# Patient Record
Sex: MALE | Race: BLACK OR AFRICAN AMERICAN | Employment: FULL TIME | URBAN - METROPOLITAN AREA
[De-identification: names, ages, dates, MRNs, and addresses within clinical notes are randomized per-mention and may not be internally consistent; named-entity substitution may affect disease eponyms.]

---

## 2024-03-19 ENCOUNTER — HOSPITAL ENCOUNTER (OUTPATIENT)
Age: 64
Discharge: HOME | End: 2024-03-19
Attending: EMERGENCY MEDICINE
Payer: COMMERCIAL

## 2024-03-19 ENCOUNTER — APPOINTMENT (OUTPATIENT)
Dept: GENERAL RADIOLOGY | Age: 64
End: 2024-03-19
Attending: EMERGENCY MEDICINE
Payer: COMMERCIAL

## 2024-03-19 VITALS
HEART RATE: 82 BPM | HEIGHT: 72 IN | OXYGEN SATURATION: 97 % | SYSTOLIC BLOOD PRESSURE: 153 MMHG | RESPIRATION RATE: 16 BRPM | WEIGHT: 230 LBS | TEMPERATURE: 98 F | BODY MASS INDEX: 31.15 KG/M2 | DIASTOLIC BLOOD PRESSURE: 92 MMHG

## 2024-03-19 DIAGNOSIS — R07.89 CHEST PAIN, ATYPICAL: Primary | ICD-10-CM

## 2024-03-19 LAB
#MXD IC: 0.6 X10ˆ3/UL (ref 0.1–1)
ATRIAL RATE: 90 BPM
BUN BLD-MCNC: 15 MG/DL (ref 7–18)
CHLORIDE BLD-SCNC: 102 MMOL/L (ref 98–112)
CO2 BLD-SCNC: 24 MMOL/L (ref 21–32)
CREAT BLD-MCNC: 1.2 MG/DL
EGFRCR SERPLBLD CKD-EPI 2021: 67 ML/MIN/1.73M2 (ref 60–?)
GLUCOSE BLD-MCNC: 147 MG/DL (ref 70–99)
HCT VFR BLD AUTO: 41.1 %
HCT VFR BLD CALC: 44 %
HGB BLD-MCNC: 12.6 G/DL
ISTAT IONIZED CALCIUM FOR CHEM 8: 1.11 MMOL/L (ref 1.12–1.32)
LYMPHOCYTES # BLD AUTO: 1.5 X10ˆ3/UL (ref 1–4)
LYMPHOCYTES NFR BLD AUTO: 35.2 %
MCH RBC QN AUTO: 21.4 PG (ref 26–34)
MCHC RBC AUTO-ENTMCNC: 30.7 G/DL (ref 31–37)
MCV RBC AUTO: 69.9 FL (ref 80–100)
MIXED CELL %: 14.2 %
NEUTROPHILS # BLD AUTO: 2.2 X10ˆ3/UL (ref 1.5–7.7)
NEUTROPHILS NFR BLD AUTO: 50.6 %
P AXIS: 54 DEGREES
P-R INTERVAL: 178 MS
PLATELET # BLD AUTO: 247 X10ˆ3/UL (ref 150–450)
POTASSIUM BLD-SCNC: 4.1 MMOL/L (ref 3.6–5.1)
Q-T INTERVAL: 326 MS
QRS DURATION: 74 MS
QTC CALCULATION (BEZET): 398 MS
R AXIS: 9 DEGREES
RBC # BLD AUTO: 5.88 X10ˆ6/UL
SODIUM BLD-SCNC: 139 MMOL/L (ref 136–145)
T AXIS: 9 DEGREES
TROPONIN I BLD-MCNC: <0.02 NG/ML
VENTRICULAR RATE: 90 BPM
WBC # BLD AUTO: 4.3 X10ˆ3/UL (ref 4–11)

## 2024-03-19 PROCEDURE — 93005 ELECTROCARDIOGRAM TRACING: CPT

## 2024-03-19 PROCEDURE — 99205 OFFICE O/P NEW HI 60 MIN: CPT

## 2024-03-19 PROCEDURE — 71046 X-RAY EXAM CHEST 2 VIEWS: CPT | Performed by: EMERGENCY MEDICINE

## 2024-03-19 PROCEDURE — 36415 COLL VENOUS BLD VENIPUNCTURE: CPT

## 2024-03-19 PROCEDURE — 84484 ASSAY OF TROPONIN QUANT: CPT

## 2024-03-19 PROCEDURE — 85025 COMPLETE CBC W/AUTO DIFF WBC: CPT | Performed by: EMERGENCY MEDICINE

## 2024-03-19 PROCEDURE — 80047 BASIC METABLC PNL IONIZED CA: CPT

## 2024-03-19 PROCEDURE — 93010 ELECTROCARDIOGRAM REPORT: CPT

## 2024-03-19 NOTE — DISCHARGE INSTRUCTIONS
Follow-up with Dr. Abraham to establish care with a new primary care doctor  Return if there is any worsening symptoms or new concerns

## 2024-03-19 NOTE — ED INITIAL ASSESSMENT (HPI)
Patient states for the last 2-3 months he has had intermittent lower chest/lung discomfort/sensation. States he is a  and does a lot of lifting, pushing, pulling, and in and out of a large semi. States the sensation is random and not after specific movements. States it occurs maybe once a week. Denies any SOB/DOUG, jaw/shoulder/back pain, CP, or other symptoms when it occurs.

## 2024-03-19 NOTE — ED PROVIDER NOTES
Patient Seen in: [facility]      History     Chief Complaint   Patient presents with    Other     Stated Complaint: Chest Pain Angina    Subjective:   HPI    63-year-old male presents to the immediate care for evaluation of some chest discomfort.  Patient works as a .  He states that every once in a while he will have a vague sensation in his chest.  He denies a pressure feeling.  Denies any burning sensation.  Denies any radiation of the discomfort.  He has no associated nausea vomiting diaphoresis.  He states it will last for seconds at a time.  Because he works primarily as a  he wanted to be evaluated.  He does not have a primary care doctor.  He states he gets yearly blood work through his employer but has not seen a physician in some time.  He has no known history of coronary artery disease.  Denies any family history of coronary disease.  He states he does regularly and that does not cause him to develop any increased discomfort in his chest.  Patient denies any pleuritic chest pain.  Denies shortness of breath.  He has no calf pain or pedal edema.  Denies any other exacerbating alleviating factors.    Objective:   History reviewed. No pertinent past medical history.           History reviewed. No pertinent surgical history.             Social History     Socioeconomic History    Marital status: Single   Tobacco Use    Smoking status: Never    Smokeless tobacco: Never   Vaping Use    Vaping Use: Never used              Review of Systems    Positive for stated complaint: Chest Pain Angina  Other systems are as noted in HPI.  Constitutional and vital signs reviewed.      All other systems reviewed and negative except as noted above.    Physical Exam     ED Triage Vitals [03/19/24 1356]   /89   Pulse 91   Resp 20   Temp 98.4 °F (36.9 °C)   Temp src Temporal   SpO2 99 %   O2 Device None (Room air)       Current:/89   Pulse 91   Temp 98.4 °F (36.9 °C)  (Temporal)   Resp 20   Ht 182.9 cm (6')   Wt 104.3 kg   SpO2 99%   BMI 31.19 kg/m²         Physical Exam  General: Alert and oriented. No acute distress.  HEENT: Normocephalic. No evidence of trauma. Extraocular movements are intact.  Cardiovascular exam: Regular rate and rhythm  Lungs: Clear to auscultation bilaterally.  Abdomen: Soft, nondistended, nontender.  Extremities: No evidence of deformity. No clubbing or cyanosis.  Neuro: No focal deficit is noted.       ED Course     Labs Reviewed   POCT CBC - Abnormal; Notable for the following components:       Result Value    RBC IC 5.88 (*)     HGB IC 12.6 (*)     MCV IC 69.9 (*)     MCH IC 21.4 (*)     MCHC IC 30.7 (*)     All other components within normal limits   POCT ISTAT CHEM8 CARTRIDGE - Abnormal; Notable for the following components:    ISTAT Ionized Calcium 1.11 (*)     ISTAT Glucose 147 (*)     All other components within normal limits   ISTAT TROPONIN - Normal     EKG    Rate, intervals and axes as noted on EKG Report.  Rate: 90  Rhythm: Sinus Rhythm  Reading: Normal sinus rhythm.  Ventricular rate 90.  J-point elevation in his precordial leads which appear to be consistent with an early repull pattern.  No acute ST elevations or depressions.  Rate, axis, normals are noted.  Agree with computer interpretation         Troponin is negative.  CBC and chemistry panel within normal limits.  PA lateral chest x-ray is negative for consolidation infiltrate.  There is no evidence of cardiomegaly or vascular congestion on my independent review of the images.  Radiologist interpretation was reviewed.  Patient is clinical history and presentation is somewhat atypical for acute coronary syndrome.  Feel the patient may be discharged home with outpatient follow-up with a new primary care doctor.         MDM   Patient was screened and evaluated during this visit.   As a treating physician attending to the patient, I determined, within reasonable clinical confidence  and prior to discharge, that an emergency medical condition was not or was no longer present.  There was no indication for further evaluation, treatment or admission on an emergency basis.  Comprehensive verbal and written discharge and follow-up instructions were provided to help prevent relapse or worsening.  Patient was instructed to follow-up with her primary care provider for further evaluation and treatment, but to return immediately to the ER for worsening, concerning, new, changing or persisting symptoms.  I discussed the case with the patient and they had no questions, complaints, or concerns.  Patient felt comfortable going home.    ^^Please note that this report has been produced using speech recognition software and may contain errors related to that system including, but not limited to, errors in grammar, punctuation, and spelling, as well as words and phrases that possibly may have been recognized inappropriately.  If there are any questions or concerns, contact the dictating provider for clarification                                   Medical Decision Making      Disposition and Plan     Clinical Impression:  1. Chest pain, atypical         Disposition:  Discharge  3/19/2024  3:21 pm    Follow-up:  Jeffery Abraham MD  [address] [address]  [phone]                Medications Prescribed:  There are no discharge medications for this patient.